# Patient Record
Sex: MALE | Race: WHITE | NOT HISPANIC OR LATINO
[De-identification: names, ages, dates, MRNs, and addresses within clinical notes are randomized per-mention and may not be internally consistent; named-entity substitution may affect disease eponyms.]

---

## 2021-05-21 PROBLEM — Z00.00 ENCOUNTER FOR PREVENTIVE HEALTH EXAMINATION: Status: ACTIVE | Noted: 2021-05-21

## 2021-06-07 ENCOUNTER — APPOINTMENT (OUTPATIENT)
Dept: ORTHOPEDIC SURGERY | Facility: CLINIC | Age: 61
End: 2021-06-07
Payer: COMMERCIAL

## 2021-06-14 ENCOUNTER — OUTPATIENT (OUTPATIENT)
Dept: OUTPATIENT SERVICES | Facility: HOSPITAL | Age: 61
LOS: 1 days | End: 2021-06-14
Payer: COMMERCIAL

## 2021-06-14 ENCOUNTER — APPOINTMENT (OUTPATIENT)
Dept: ORTHOPEDIC SURGERY | Facility: CLINIC | Age: 61
End: 2021-06-14
Payer: COMMERCIAL

## 2021-06-14 ENCOUNTER — RESULT REVIEW (OUTPATIENT)
Age: 61
End: 2021-06-14

## 2021-06-14 VITALS — WEIGHT: 210 LBS | HEIGHT: 69 IN | BODY MASS INDEX: 31.1 KG/M2

## 2021-06-14 VITALS — HEIGHT: 69 IN | BODY MASS INDEX: 31.1 KG/M2 | WEIGHT: 210 LBS

## 2021-06-14 DIAGNOSIS — M51.26 OTHER INTERVERTEBRAL DISC DISPLACEMENT, LUMBAR REGION: ICD-10-CM

## 2021-06-14 DIAGNOSIS — M25.511 PAIN IN RIGHT SHOULDER: ICD-10-CM

## 2021-06-14 DIAGNOSIS — Z78.9 OTHER SPECIFIED HEALTH STATUS: ICD-10-CM

## 2021-06-14 PROCEDURE — 99204 OFFICE O/P NEW MOD 45 MIN: CPT | Mod: 25

## 2021-06-14 PROCEDURE — 99072 ADDL SUPL MATRL&STAF TM PHE: CPT

## 2021-06-14 PROCEDURE — 20610 DRAIN/INJ JOINT/BURSA W/O US: CPT | Mod: 50

## 2021-06-14 PROCEDURE — 73030 X-RAY EXAM OF SHOULDER: CPT

## 2021-06-14 PROCEDURE — 73030 X-RAY EXAM OF SHOULDER: CPT | Mod: 26,50

## 2021-06-14 RX ORDER — BUDESONIDE 1 MG/2ML
SUSPENSION RESPIRATORY (INHALATION)
Refills: 0 | Status: ACTIVE | COMMUNITY

## 2021-06-14 NOTE — PROCEDURE
[de-identified] : The right shoulder was prepped with alchohol and ethyl chloride was used to numb the skin. A 3 cc injection with 1 cc xylocaine, 1cc sensoricaine and 1 cc depomedrol , was given without complication into the SA space.. Patient instructed that there may be an inflammatory flare for 24 hrs , to use ice or advil if needed\par The left shoulder was prepped with alchohol and ethyl chloride was used to numb the skin. A 3 cc injection with 1 cc xylocaine, 1 cc sensoricaine and 1 cc depomedrol , was given without complication into the SA space. Patient instructed that there may be an inflammatory flare for 24 hrs , to use ice or advil if needed\par \par \par 
(4) excellent

## 2021-06-14 NOTE — PHYSICAL EXAM
[de-identified] : Right shoulder AROM 40 abduction 60 degrees FF 0 degrees ER and IR less than L1\par ER (IS) grade 3 SS grade 3-4 \par Left shoulder positive Neer impingement sign positive Hawkisn but full AROM and SS IS 5/5\par  [de-identified] : 4 views of shoulder and MRI reviewed which show chronic head elevation and dec acromio-humeral distance. MRI shows 2 tendon retracted tear of SS and IS with partial tear Subscap and atrophy of the SS and IS. \par

## 2021-06-14 NOTE — REVIEW OF SYSTEMS
[Joint Pain] : joint pain [Joint Stiffness] : joint stiffness [Feeling Tired] : fatigue [Negative] : Heme/Lymph
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
## 2021-07-19 ENCOUNTER — APPOINTMENT (OUTPATIENT)
Dept: ORTHOPEDIC SURGERY | Facility: CLINIC | Age: 61
End: 2021-07-19
Payer: COMMERCIAL

## 2021-07-19 ENCOUNTER — RESULT REVIEW (OUTPATIENT)
Age: 61
End: 2021-07-19

## 2021-07-19 ENCOUNTER — OUTPATIENT (OUTPATIENT)
Dept: OUTPATIENT SERVICES | Facility: HOSPITAL | Age: 61
LOS: 1 days | End: 2021-07-19
Payer: COMMERCIAL

## 2021-07-19 DIAGNOSIS — S42.294A OTHER NONDISPLACED FRACTURE OF UPPER END OF RIGHT HUMERUS, INITIAL ENCOUNTER FOR CLOSED FRACTURE: ICD-10-CM

## 2021-07-19 DIAGNOSIS — S42.295A OTHER NONDISPLACED FRACTURE OF UPPER END OF LEFT HUMERUS, INITIAL ENCOUNTER FOR CLOSED FRACTURE: ICD-10-CM

## 2021-07-19 PROCEDURE — 99072 ADDL SUPL MATRL&STAF TM PHE: CPT

## 2021-07-19 PROCEDURE — 73030 X-RAY EXAM OF SHOULDER: CPT | Mod: 26,50

## 2021-07-19 PROCEDURE — 99214 OFFICE O/P EST MOD 30 MIN: CPT

## 2021-07-19 PROCEDURE — 73030 X-RAY EXAM OF SHOULDER: CPT
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
## 2021-07-19 NOTE — PHYSICAL EXAM
[de-identified] : Right shoulder decreased AROM in IR/ER and Forward flexion. ALready had weak SS IS from chri=onic tear. \par Left shoulder positive impingment sign but full AROM. \par  [de-identified] : New x ray of right shoulder shows new non displaced surgical neck Fx of the right shoulder.
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
## 2021-08-16 ENCOUNTER — RESULT REVIEW (OUTPATIENT)
Age: 61
End: 2021-08-16

## 2021-08-16 ENCOUNTER — APPOINTMENT (OUTPATIENT)
Dept: ORTHOPEDIC SURGERY | Facility: CLINIC | Age: 61
End: 2021-08-16
Payer: COMMERCIAL

## 2021-08-16 ENCOUNTER — OUTPATIENT (OUTPATIENT)
Dept: OUTPATIENT SERVICES | Facility: HOSPITAL | Age: 61
LOS: 1 days | End: 2021-08-16
Payer: COMMERCIAL

## 2021-08-16 DIAGNOSIS — S46.011A STRAIN OF MUSCLE(S) AND TENDON(S) OF THE ROTATOR CUFF OF RIGHT SHOULDER, INITIAL ENCOUNTER: ICD-10-CM

## 2021-08-16 PROCEDURE — 73030 X-RAY EXAM OF SHOULDER: CPT

## 2021-08-16 PROCEDURE — 73030 X-RAY EXAM OF SHOULDER: CPT | Mod: 26,RT

## 2021-08-16 PROCEDURE — 99213 OFFICE O/P EST LOW 20 MIN: CPT

## 2021-08-16 NOTE — DISCUSSION/SUMMARY
[de-identified] : Abdulaziz has healed his proximal humerus fx and is gaining gopod function . We must remember he has right shoulder chronic retracted 2 tendon RC tears which are likely non repairable which were there before this Fx. \par Plan \par Continue PT see what function we can return him too and then consider RTSA if symtpoms persist. \par However based on today's exam I am hopeful he can wait for surgery. \par He is currently out of work till Sept 1 then returns light duty. \par

## 2021-08-16 NOTE — HISTORY OF PRESENT ILLNESS
[de-identified] : 61 y/o male presents for Right Shoulder follow up.His right shoulder is feeling so much better He can rasie overhead now without too much pain. \par

## 2021-08-16 NOTE — PHYSICAL EXAM
[de-identified] : Right shoulder AROM 160 FF ER 90 IR L2\par Positive Pablo NEg Neer\par SS 4/5 IS 5/5 Subscap 5/5\par

## 2021-10-04 ENCOUNTER — APPOINTMENT (OUTPATIENT)
Dept: ORTHOPEDIC SURGERY | Facility: CLINIC | Age: 61
End: 2021-10-04
Payer: COMMERCIAL

## 2021-10-04 VITALS — WEIGHT: 210 LBS | HEIGHT: 69 IN | BODY MASS INDEX: 31.1 KG/M2

## 2021-10-04 DIAGNOSIS — M75.42 IMPINGEMENT SYNDROME OF LEFT SHOULDER: ICD-10-CM

## 2021-10-04 DIAGNOSIS — S42.294D OTHER NONDISPLACED FRACTURE OF UPPER END OF RIGHT HUMERUS, SUBSEQUENT ENCOUNTER FOR FRACTURE WITH ROUTINE HEALING: ICD-10-CM

## 2021-10-04 PROCEDURE — 99214 OFFICE O/P EST MOD 30 MIN: CPT

## 2021-10-04 NOTE — DISCUSSION/SUMMARY
[de-identified] : Right shoulder fracture now healed and improving function with his chronic RC retracted tear (unrepairable) I think in 1 more month he will be at [facility]\par He can work now buit still restricted to 30 pounds to waist and no overhead lifting for one more month. \par In addition his left shoulder impingement is still painful despite PT for 2 months and injection and therefore I would like to get an MRI to r/o partial  RC tear. \par Plan \par New PT in [place] for 1 month\par MRI left shoulder r/o partial RC tear. \par F/U 4 weeks\par \par

## 2021-10-04 NOTE — HISTORY OF PRESENT ILLNESS
[de-identified] : 62 y/o male presents  Right Shoulder follow up. Patient states that he's been unable to attend many sessions of physical therapy due to insurance coverage however, he's proceeded with home exercises and cycling which has helped with his strengthening. He states that the following day after his bike ride he experienced a sharp pain in his right shoulder but has yet to experience it again, in terms of his home exercises he is able to do a few reps with light weights but not for long periods of time.  He's returned to work and is interested in proceeding with a MRI due to his his pain not completely recovering as of late.

## 2021-10-04 NOTE — PHYSICAL EXAM
[de-identified] : Left shoulder positive Neer neg Pablo SS5/5 Is 5/5\par Right shoulder AROM  ER 50 degrees and IR L1 SS 4/5 IS 4/5\par

## 2021-11-01 ENCOUNTER — APPOINTMENT (OUTPATIENT)
Dept: ORTHOPEDIC SURGERY | Facility: CLINIC | Age: 61
End: 2021-11-01
Payer: COMMERCIAL

## 2021-11-22 ENCOUNTER — APPOINTMENT (OUTPATIENT)
Dept: ORTHOPEDIC SURGERY | Facility: CLINIC | Age: 61
End: 2021-11-22
Payer: COMMERCIAL

## 2021-11-22 PROCEDURE — 99214 OFFICE O/P EST MOD 30 MIN: CPT

## 2021-11-22 NOTE — PHYSICAL EXAM
[de-identified] : Left shoulder  ER 90 IR T12\par Right shoulder  ER 90 IR L1. \par Neg Pablo bilaterally. \par SS and IS isometric testing despite MRI are 4+/\par

## 2021-11-22 NOTE — HISTORY OF PRESENT ILLNESS
[de-identified] : Mr Ortiz presents today for a follow up mri review of the left shoulder and to follow up finally on his healed right shoulder Proximal humerus fx superimposed on a chronic RC unrepairable tear. \par Both shoulders have improved. \par Left shoulder MRI reviewed today.

## 2021-11-22 NOTE — DISCUSSION/SUMMARY
[de-identified] : Jacqueline is now at [facility] improvement from his recent injuries superimposed on chronic retracted RC tears. \par Despite these tears he has full ROM no pain at rest and with use from chest down. \par Plan \par No surgery now. He would need Reverse TSA or balloon insertion but symptoms do not warrant that at this time. \par He can work with permanent restriction no lifting overhead more than 2 pounds and to waist 30 pounds. \par

## 2022-07-20 ENCOUNTER — APPOINTMENT (OUTPATIENT)
Dept: INTERNAL MEDICINE | Facility: CLINIC | Age: 62
End: 2022-07-20

## 2022-08-10 ENCOUNTER — APPOINTMENT (OUTPATIENT)
Dept: INTERNAL MEDICINE | Facility: CLINIC | Age: 62
End: 2022-08-10

## 2022-12-07 ENCOUNTER — APPOINTMENT (OUTPATIENT)
Dept: CT IMAGING | Facility: CLINIC | Age: 62
End: 2022-12-07

## 2022-12-14 ENCOUNTER — APPOINTMENT (OUTPATIENT)
Dept: CT IMAGING | Facility: CLINIC | Age: 62
End: 2022-12-14

## 2022-12-14 ENCOUNTER — OUTPATIENT (OUTPATIENT)
Dept: OUTPATIENT SERVICES | Facility: HOSPITAL | Age: 62
LOS: 1 days | End: 2022-12-14

## 2022-12-14 PROCEDURE — 71250 CT THORAX DX C-: CPT | Mod: 26

## 2022-12-14 PROCEDURE — 74177 CT ABD & PELVIS W/CONTRAST: CPT | Mod: 26

## 2024-01-23 ENCOUNTER — OFFICE (OUTPATIENT)
Facility: LOCATION | Age: 64
Setting detail: OPHTHALMOLOGY
End: 2024-01-23
Payer: COMMERCIAL

## 2024-01-23 DIAGNOSIS — H52.4: ICD-10-CM

## 2024-01-23 DIAGNOSIS — H25.13: ICD-10-CM

## 2024-01-23 PROCEDURE — 92004 COMPRE OPH EXAM NEW PT 1/>: CPT | Performed by: OPHTHALMOLOGY

## 2024-01-23 PROCEDURE — 92015 DETERMINE REFRACTIVE STATE: CPT | Performed by: OPHTHALMOLOGY

## 2024-01-23 ASSESSMENT — CONFRONTATIONAL VISUAL FIELD TEST (CVF)
OD_FINDINGS: FULL
OS_FINDINGS: FULL

## 2024-01-24 ASSESSMENT — REFRACTION_MANIFEST
OD_CYLINDER: -1.00
OS_SPECTYPE: PROGS
OD_ADD: +2.50
OD_SPHERE: -5.25
OD_SPECTYPE: PROGS
OS_VA1: 20/25+2
OS_AXIS: 035
OS_ADD: +2.50
OS_CYLINDER: -0.50
OD_AXIS: 020
OS_SPHERE: -6.50
OD_VA1: 20/20

## 2024-01-24 ASSESSMENT — REFRACTION_CURRENTRX
OS_OVR_VA: 20/
OD_OVR_VA: 20/
OS_SPHERE: -6.50
OD_AXIS: 035
OD_ADD: +2.00
OS_ADD: +2.00
OS_CYLINDER: PLANO
OD_SPHERE: -5.50
OD_CYLINDER: -0.75

## 2024-01-24 ASSESSMENT — REFRACTION_AUTOREFRACTION
OS_CYLINDER: -0.50
OS_AXIS: 045
OD_SPHERE: -4.50
OS_SPHERE: -5.50
OD_CYLINDER: -1.50
OD_AXIS: 180

## 2024-01-24 ASSESSMENT — SPHEQUIV_DERIVED
OS_SPHEQUIV: -5.75
OD_SPHEQUIV: -5.25
OS_SPHEQUIV: -6.75
OD_SPHEQUIV: -5.75

## 2024-03-27 ENCOUNTER — APPOINTMENT (OUTPATIENT)
Dept: MRI IMAGING | Facility: CLINIC | Age: 64
End: 2024-03-27

## 2024-03-27 ENCOUNTER — OUTPATIENT (OUTPATIENT)
Dept: OUTPATIENT SERVICES | Facility: HOSPITAL | Age: 64
LOS: 1 days | End: 2024-03-27

## 2024-03-27 PROCEDURE — 72141 MRI NECK SPINE W/O DYE: CPT | Mod: 26,1L

## 2024-03-28 ENCOUNTER — APPOINTMENT (OUTPATIENT)
Dept: ORTHOPEDIC SURGERY | Facility: CLINIC | Age: 64
End: 2024-03-28
Payer: COMMERCIAL

## 2024-03-28 PROCEDURE — 73030 X-RAY EXAM OF SHOULDER: CPT | Mod: 50

## 2024-03-28 PROCEDURE — 99203 OFFICE O/P NEW LOW 30 MIN: CPT

## 2024-03-28 NOTE — PHYSICAL EXAM
[de-identified] : Left shoulder AROM  ER 85 IR L1 Right shoulder bradley ROM NEgative Pablo Positive Neer SS and subscap 5/5 to isometric testing   IS 4/5 bilaterally   [de-identified] : Right shoulder 4 views shows chronic head elevation and minimal GH change  No acute Fx or dislocation Left shoulder 4 views shows no head elevation mild gh change and no acute fx or subluxation
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
## 2024-03-28 NOTE — PROCEDURE
[de-identified] : The left shoulder was prepped with alcohol and ethyl chloride was used to numb the skin. A 6 cc injection with 3 cc xylocaine, 2 cc sensoricaine and 1 cc kenalog was given without complication into the SA space. Patient instructed that there may be an inflammatory flare for 24 hrs , to use ice or advil if needed. The right shoulder was prepped with alcohol and ethyl chloride was used to numb the skin. A 6 cc injection with 3 cc xylocaine, 2 cc sensoricaine and 1 cc kenalog was given without complication into the Sub acromial space. Patient instructed that there may be an inflammatory flare for 24 hrs , to use ice or advil if needed

## 2024-04-15 ENCOUNTER — APPOINTMENT (OUTPATIENT)
Dept: ORTHOPEDIC SURGERY | Facility: CLINIC | Age: 64
End: 2024-04-15
Payer: COMMERCIAL

## 2024-04-15 ENCOUNTER — NON-APPOINTMENT (OUTPATIENT)
Age: 64
End: 2024-04-15

## 2024-04-15 DIAGNOSIS — Z87.19 PERSONAL HISTORY OF OTHER DISEASES OF THE DIGESTIVE SYSTEM: ICD-10-CM

## 2024-04-15 PROCEDURE — 71100 X-RAY EXAM RIBS UNI 2 VIEWS: CPT | Mod: LT

## 2024-04-15 PROCEDURE — 99213 OFFICE O/P EST LOW 20 MIN: CPT

## 2024-04-15 RX ORDER — UPADACITINIB 45 MG/1
TABLET, EXTENDED RELEASE ORAL
Refills: 0 | Status: ACTIVE | COMMUNITY
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
## 2024-04-22 ENCOUNTER — APPOINTMENT (OUTPATIENT)
Dept: ORTHOPEDIC SURGERY | Facility: CLINIC | Age: 64
End: 2024-04-22
Payer: COMMERCIAL

## 2024-04-22 VITALS
BODY MASS INDEX: 31.1 KG/M2 | OXYGEN SATURATION: 98 % | HEIGHT: 69 IN | SYSTOLIC BLOOD PRESSURE: 115 MMHG | HEART RATE: 100 BPM | TEMPERATURE: 98.2 F | WEIGHT: 210 LBS | DIASTOLIC BLOOD PRESSURE: 73 MMHG

## 2024-04-22 DIAGNOSIS — S13.4XXA SPRAIN OF LIGAMENTS OF CERVICAL SPINE, INITIAL ENCOUNTER: ICD-10-CM

## 2024-04-22 DIAGNOSIS — M54.2 CERVICALGIA: ICD-10-CM

## 2024-04-22 PROCEDURE — 72084 X-RAY EXAM ENTIRE SPI 6/> VW: CPT

## 2024-04-22 PROCEDURE — 99205 OFFICE O/P NEW HI 60 MIN: CPT

## 2024-04-24 PROBLEM — S13.4XXA WHIPLASH INJURY TO NECK, INITIAL ENCOUNTER: Status: ACTIVE | Noted: 2024-03-28

## 2024-04-24 PROBLEM — M54.2 NECK PAIN: Status: ACTIVE | Noted: 2024-04-24

## 2024-04-24 NOTE — DISCUSSION/SUMMARY
[de-identified] : I reviewed the natural history of this diagnosis with the patient.  I have recommended ongoing physical therapy for mobility and strengthening of the muscles affecting the stability and position of the third cervical thoracic junction and neck globally.  I am pleased that he has noted some improvement with this so far.  We also described local modalities including ice and heat as well as oral anti-inflammatories if needed.  He will keep us informed of his progress  I have spent greater than 60 minutes preparing to see the patient, collecting relevant history, performing a thorough history and physical examination, counseling the patient regarding my findings ordering the appropriate therapies and tests, communicating with other relevant healthcare professionals, documenting my encounter and coordinating care.

## 2024-04-24 NOTE — PHYSICAL EXAM
[UE/LE] : Sensory: Intact in bilateral upper & lower extremities [Normal DTR Reflexes] : DTR reflexes normal [Normal Touch] : sensation intact for touch [Normal Proprioception] : sensation intact for proprioception [Normal] : No costovertebral angle tenderness and no spinal tenderness [de-identified] : 4 view cervical spine x-rays Ortho PACS 4/22/2024: Minimal loss of disc space height at C4-5 and C5-6.  Small anterior osteophyte formation.  Lordosis and mobility maintained.  No coronal abnormality.  No fracture or dislocation
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
## 2024-04-29 DIAGNOSIS — M54.9 DORSALGIA, UNSPECIFIED: ICD-10-CM

## 2024-05-08 ENCOUNTER — APPOINTMENT (OUTPATIENT)
Dept: ORTHOPEDIC SURGERY | Facility: CLINIC | Age: 64
End: 2024-05-08
Payer: COMMERCIAL

## 2024-05-08 DIAGNOSIS — S20.219A CONTUSION OF UNSPECIFIED FRONT WALL OF THORAX, INITIAL ENCOUNTER: ICD-10-CM

## 2024-05-08 DIAGNOSIS — S22.42XA MULTIPLE FRACTURES OF RIBS, LEFT SIDE, INITIAL ENCOUNTER FOR CLOSED FRACTURE: ICD-10-CM

## 2024-05-08 PROCEDURE — 99213 OFFICE O/P EST LOW 20 MIN: CPT
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
## 2024-05-08 NOTE — PHYSICAL EXAM
[Normal] : Gait: normal [Normal Touch] : sensation intact for touch [de-identified] : Chest No edema, ecchymoses, erythema, deformity chest wall.. There is slight tenderness in the left chest.  No crepitus. He is moving much better with less pain when changing position.  He can reach his arms overhead with mild stiffness. .  He is walking normally. Normal speech and respirations.  No shoulder pain with range of motion [de-identified] :  I reviewed the report of the MRI April 15, 2024 abdomen at [facility] which states that there is pancreatic cysts, cholelithiasis, renal cysts, moderate ventral hernia and some enhancement in the region of the left anterior ribs around the fourth rib that could be related to a fracture or contusion.  X-rays taken 4/15/24 left ribs 2 views Ordered to rule out fracture were performed and showed no visible fractures  EXAM: [id number] - MR SPINE CERVICAL - ORDERED BY: DOE FOX PROCEDURE DATE: 03/27/2024 INTERPRETATION: Neck pain. Impression:  Minimal retrolisthesis of C4 on C5.  Multilevel degenerative changes of the cervical spine. No evidence of abnormal signal changes within the spinal cord. Correlate with the patient's physical examination for myelopathy.  C3/C4 small right subarticular and foraminal disc protrusion mildly flattening the ventral thecal sac and contributing to moderate to severe right foraminal narrowing. C3/C4 no evidence of spinal cord compression.  C4/C5 disc osteophyte complex with a small central disc protrusion flattening the ventral spinal cord with moderate to severe bilateral foraminal narrowing, left greater than right. C4/C5 minimal spinal cord compression.  C5/C6 diffuse disc bulge with a a tiny right subarticular and foraminal disc protrusion flattening the ventral spinal cord, right greater than left with moderate to severe left and severe right foraminal narrowing. C5/C6 minimal spinal cord compression.  C6/C7 diffuse disc bulge flattening the ventral spinal cord with severe bilateral foraminal narrowing. C6/C7 minimal spinal cord compression.  C7/T1 minimal broad-based central disc protrusion with mild right and moderate left foraminal narrowing. C7/T1 no evidence of spinal cord compression
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
## 2024-05-16 ENCOUNTER — APPOINTMENT (OUTPATIENT)
Dept: ORTHOPEDIC SURGERY | Facility: CLINIC | Age: 64
End: 2024-05-16
Payer: COMMERCIAL

## 2024-05-16 DIAGNOSIS — M12.811 OTHER SPECIFIC ARTHROPATHIES, NOT ELSEWHERE CLASSIFIED, RIGHT SHOULDER: ICD-10-CM

## 2024-05-16 DIAGNOSIS — M12.812 OTHER SPECIFIC ARTHROPATHIES, NOT ELSEWHERE CLASSIFIED, LEFT SHOULDER: ICD-10-CM

## 2024-05-16 PROCEDURE — 99213 OFFICE O/P EST LOW 20 MIN: CPT

## 2024-05-16 NOTE — DISCUSSION/SUMMARY
[de-identified] : Jacqueline has done well with non operative Rx to his shoulders  He can return to work with his permanent restrictions of no lifting greater than 20 pounds to waist and no overhead lifting greater than 5 lbs. Avoid pulling heavy pallets.  F/U prn

## 2024-05-16 NOTE — PHYSICAL EXAM
[de-identified] : Right and left shoulder  ER 90 IR L1 SS Is and subscap 5/5 to isometric testing

## 2024-05-16 NOTE — HISTORY OF PRESENT ILLNESS
[de-identified] : Jacqueline returns for final follow-up of his non operative Rx for bilateral RC pathology.  He has regained full ROM and use of the arm . Still needs some lifting restrictions due to unrepairable RC tear.  His rib injuries have healed as well.

## 2024-07-26 DIAGNOSIS — M12.811 OTHER SPECIFIC ARTHROPATHIES, NOT ELSEWHERE CLASSIFIED, RIGHT SHOULDER: ICD-10-CM

## 2024-07-26 DIAGNOSIS — M12.812 OTHER SPECIFIC ARTHROPATHIES, NOT ELSEWHERE CLASSIFIED, LEFT SHOULDER: ICD-10-CM

## 2024-08-01 DIAGNOSIS — M54.9 DORSALGIA, UNSPECIFIED: ICD-10-CM

## 2024-08-01 DIAGNOSIS — M54.2 CERVICALGIA: ICD-10-CM

## 2025-02-13 ENCOUNTER — RX ONLY (RX ONLY)
Age: 65
End: 2025-02-13

## 2025-02-13 ENCOUNTER — OFFICE (OUTPATIENT)
Facility: LOCATION | Age: 65
Setting detail: OPHTHALMOLOGY
End: 2025-02-13
Payer: COMMERCIAL

## 2025-02-13 DIAGNOSIS — S05.02XA: ICD-10-CM

## 2025-02-13 PROCEDURE — 92012 INTRM OPH EXAM EST PATIENT: CPT | Performed by: OPHTHALMOLOGY

## 2025-02-13 ASSESSMENT — KERATOMETRY
OD_K2POWER_DIOPTERS: 43.75
METHOD_AUTO_MANUAL: AUTO
OS_K2POWER_DIOPTERS: 44.25
OS_K1POWER_DIOPTERS: 43.75
OD_K1POWER_DIOPTERS: 43.25
OD_AXISANGLE_DEGREES: 100
OS_AXISANGLE_DEGREES: 110

## 2025-02-13 ASSESSMENT — REFRACTION_CURRENTRX
OD_SPHERE: -5.50
OD_ADD: +2.00
OS_CYLINDER: PLANO
OS_OVR_VA: 20/
OD_CYLINDER: -0.75
OD_OVR_VA: 20/
OD_AXIS: 035
OS_SPHERE: -6.50
OS_ADD: +2.00

## 2025-02-13 ASSESSMENT — REFRACTION_MANIFEST
OD_SPECTYPE: PROGS
OD_CYLINDER: -1.00
OS_CYLINDER: -0.50
OS_SPECTYPE: PROGS
OS_VA1: 20/25+2
OS_SPHERE: -6.50
OD_AXIS: 020
OD_SPHERE: -5.25
OD_ADD: +2.50
OS_AXIS: 035
OS_ADD: +2.50
OD_VA1: 20/20

## 2025-02-13 ASSESSMENT — REFRACTION_AUTOREFRACTION
OD_CYLINDER: -1.50
OD_AXIS: 180
OS_AXIS: 045
OS_CYLINDER: -0.50
OD_SPHERE: -4.50
OS_SPHERE: -5.50

## 2025-02-13 ASSESSMENT — VISUAL ACUITY
OD_BCVA: 20/20-1
OS_BCVA: 20/25-2

## 2025-02-13 ASSESSMENT — CORNEAL TRAUMA - ABRASION: OD_ABRASION: PRESENT

## 2025-02-13 ASSESSMENT — CONFRONTATIONAL VISUAL FIELD TEST (CVF)
OS_FINDINGS: FULL
OD_FINDINGS: FULL

## 2025-03-20 ENCOUNTER — OFFICE (OUTPATIENT)
Facility: LOCATION | Age: 65
Setting detail: OPHTHALMOLOGY
End: 2025-03-20
Payer: COMMERCIAL

## 2025-03-20 DIAGNOSIS — H25.13: ICD-10-CM

## 2025-03-20 PROCEDURE — 92014 COMPRE OPH EXAM EST PT 1/>: CPT | Performed by: OPHTHALMOLOGY

## 2025-03-20 ASSESSMENT — KERATOMETRY
METHOD_AUTO_MANUAL: AUTO
OD_AXISANGLE_DEGREES: 43.
OS_K2POWER_DIOPTERS: 44.00
OS_AXISANGLE_DEGREES: 140
OS_K1POWER_DIOPTERS: 7510540
OD_K1POWER_DIOPTERS: 43.00
OD_K2POWER_DIOPTERS: 43.75

## 2025-03-20 ASSESSMENT — REFRACTION_MANIFEST
OS_SPHERE: -6.50
OD_SPHERE: -5.25
OS_AXIS: 035
OD_CYLINDER: -1.00
OD_SPECTYPE: PROGS
OD_AXIS: 020
OS_CYLINDER: -0.50
OS_ADD: +2.50
OD_ADD: +2.50
OD_VA1: 20/20
OS_VA1: 20/25+2
OS_SPECTYPE: PROGS

## 2025-03-20 ASSESSMENT — REFRACTION_AUTOREFRACTION
OD_SPHERE: -4.50
OS_SPHERE: -5.25
OS_CYLINDER: -0.75
OD_AXIS: 015
OD_CYLINDER: -1.00
OS_AXIS: 065

## 2025-03-20 ASSESSMENT — CORNEAL TRAUMA - ABRASION: OD_ABRASION: ABSENT

## 2025-03-20 ASSESSMENT — VISUAL ACUITY
OD_BCVA: 20/25-1
OS_BCVA: 20/30-2

## 2025-03-20 ASSESSMENT — CONFRONTATIONAL VISUAL FIELD TEST (CVF)
OS_FINDINGS: FULL
OD_FINDINGS: FULL

## 2025-03-20 ASSESSMENT — REFRACTION_CURRENTRX
OS_CYLINDER: PLANO
OD_OVR_VA: 20/
OS_OVR_VA: 20/
OS_SPHERE: -6.50
OD_AXIS: 035
OS_ADD: +2.00
OD_SPHERE: -5.50
OD_CYLINDER: -0.75
OD_ADD: +2.00